# Patient Record
Sex: MALE | Race: WHITE
[De-identification: names, ages, dates, MRNs, and addresses within clinical notes are randomized per-mention and may not be internally consistent; named-entity substitution may affect disease eponyms.]

---

## 2017-05-21 ENCOUNTER — HOSPITAL ENCOUNTER (EMERGENCY)
Dept: HOSPITAL 17 - NEPK | Age: 22
Discharge: LEFT BEFORE DISCHARGE | End: 2017-05-21
Payer: COMMERCIAL

## 2017-05-21 VITALS
RESPIRATION RATE: 16 BRPM | OXYGEN SATURATION: 100 % | TEMPERATURE: 98.8 F | SYSTOLIC BLOOD PRESSURE: 131 MMHG | DIASTOLIC BLOOD PRESSURE: 76 MMHG | HEART RATE: 91 BPM

## 2017-05-21 DIAGNOSIS — R59.1: Primary | ICD-10-CM

## 2017-05-21 PROCEDURE — 99281 EMR DPT VST MAYX REQ PHY/QHP: CPT

## 2017-05-21 NOTE — PD
HPI


Chief Complaint:  Lump, Cyst, Hernia


Time Seen by Provider:  20:45


Travel History


International Travel<30 days:  No


Contact w/Intl Traveler<30days:  No


Traveled to known affect area:  No





History of Present Illness


HPI


22-year-old male presents to emergency department for evaluation of a palpable 

lump in his groin area noticed approximately 4 months ago.  Patient states it 

does not hurt.  Denies any recent illnesses, fever, or chills.  He states he 

does have cats in the house and has probably sustained several scratches.  

Denies any testicular lesions.  No urinary symptoms.  No pelvic or abdominal 

pain.  He states he is otherwise healthy.  He has no other symptoms to report.  

Patient states he does have a primary care provider through his mother's 

insurance he cannot think of the name of them right now





History


Past Medical Histgory


Medical History:  Denies Significant Hx


Hx Cancer:  No





Social History


Alcohol Use:  No


Tobacco Use:  No





Allergies-Medications


(Allergen,Severity, Reaction):  


Coded Allergies:  


     No Known Allergies (Verified , 5/21/17)


Reported Meds & Prescriptions





Reported Meds & Active Scripts


Active


Zoloft (Sertraline HCl) 50 Mg Tab 25 Mg PO DAILY 








Review of Systems


Except as stated in HPI:  all other systems reviewed are Neg





Physical Exam


Narrative


GENERAL: Well-nourished, well-developed patient in no acute distress


SKIN: Focused skin assessment warm/dry.


HEAD: Normocephalic.


EYES: No scleral icterus. No injection or drainage. 


NECK: Supple, trachea midline. No JVD or lymphadenopathy.


CARDIOVASCULAR: Regular rate and rhythm without murmurs, gallops, or rubs. 


RESPIRATORY: Breath sounds equal bilaterally. No accessory muscle use.


Abdomen:  Abdomen soft, non-tender, nondistended. Positive bowel sounds.  No 

hepato-splenomegaly, or palpable masses. No guarding.  There is a 1 cm in 

diameter palpable, mobile lymph node in the right inguinal area.  No erythema 

or edema.


GENITOURINARY:  Circumcised. Testes descended bilaterally without evidence of 

rotation.  No lesions or erythema.  No urethral discharge.


MUSCULOSKELETAL: No cyanosis, or edema. 


BACK: Nontender without obvious deformity. No CVA tenderness.





Data


Data


Last Documented VS





Vital Signs








  Date Time  Temp Pulse Resp B/P Pulse Ox O2 Delivery O2 Flow Rate FiO2


 


5/21/17 20:11 98.8 91 16 131/76 100 Room Air  











MDM


Medical Screen Exam Complete:  Yes


Emergency Medical Condition:  No


Differential Diagnosis


Inguinal lymphadenopathy versus cat scratch disease versus local reaction 

versus cancer


Narrative Course


22-year-old male presents to the emergency department for evaluation of a 

palpable lymph node in his right inguinal chain.  H otherwise appears well.  

There is a palpable 1 cm diameter, mobile lymph node noted in this area.  

Testicular exam is completely benign at this time.  I discussed in depth the 

possible etiologies of this from very benign to very serious to include cancer.

  He states that he does have a primary care provider and he can follow-up with 

outpatient.  I advised that he call tomorrow which is Monday to schedule an 

appointment for further evaluation of this.  He verbalizes understanding and 

agrees to follow through with this plan of care.  He agrees to return 

immediately with any acute worsening of symptoms.


A medical screening exam was performed: 


At the time of evaluation the presenting medical condition was determined not 

to be of an emergent nature. 


The patient was given the option of receiving additional care, but declined. 


Patient was given options for additional community resources from which to 

obtain care.


The Patient Has Been advised to seek medical attention for their presenting 

complaint.


The patient has been advised to return to the ER at any time if an emergent 

condition develops.





 Primary Impression:  


 Inguinal lymphadenopathy


 Additional Impression:  


 Encounter for medical screening examination


Condition:  Stable








Vandana Wooten May 21, 2017 20:45

## 2018-05-04 ENCOUNTER — HOSPITAL ENCOUNTER (INPATIENT)
Dept: HOSPITAL 17 - NEPD | Age: 23
LOS: 7 days | Discharge: HOME | DRG: 885 | End: 2018-05-11
Attending: PSYCHIATRY & NEUROLOGY | Admitting: PSYCHIATRY & NEUROLOGY
Payer: COMMERCIAL

## 2018-05-04 VITALS
DIASTOLIC BLOOD PRESSURE: 73 MMHG | OXYGEN SATURATION: 97 % | RESPIRATION RATE: 18 BRPM | HEART RATE: 56 BPM | TEMPERATURE: 97.5 F | SYSTOLIC BLOOD PRESSURE: 113 MMHG

## 2018-05-04 VITALS — BODY MASS INDEX: 16 KG/M2 | WEIGHT: 105.6 LBS | HEIGHT: 68 IN

## 2018-05-04 VITALS
SYSTOLIC BLOOD PRESSURE: 115 MMHG | RESPIRATION RATE: 16 BRPM | HEART RATE: 92 BPM | OXYGEN SATURATION: 99 % | TEMPERATURE: 97.6 F | DIASTOLIC BLOOD PRESSURE: 62 MMHG

## 2018-05-04 VITALS
DIASTOLIC BLOOD PRESSURE: 80 MMHG | SYSTOLIC BLOOD PRESSURE: 114 MMHG | OXYGEN SATURATION: 99 % | RESPIRATION RATE: 18 BRPM | HEART RATE: 63 BPM | TEMPERATURE: 97.5 F

## 2018-05-04 DIAGNOSIS — Z91.5: ICD-10-CM

## 2018-05-04 DIAGNOSIS — F14.10: ICD-10-CM

## 2018-05-04 DIAGNOSIS — R45.851: ICD-10-CM

## 2018-05-04 DIAGNOSIS — F10.10: ICD-10-CM

## 2018-05-04 DIAGNOSIS — F12.90: ICD-10-CM

## 2018-05-04 DIAGNOSIS — Z87.891: ICD-10-CM

## 2018-05-04 DIAGNOSIS — F33.2: Primary | ICD-10-CM

## 2018-05-04 LAB
ALBUMIN SERPL-MCNC: 3.9 GM/DL (ref 3.4–5)
ALP SERPL-CCNC: 79 U/L (ref 45–117)
ALT SERPL-CCNC: 17 U/L (ref 12–78)
APAP SERPL-MCNC: (no result) MCG/ML (ref 10–30)
AST SERPL-CCNC: 22 U/L (ref 15–37)
BASOPHILS # BLD AUTO: 0 TH/MM3 (ref 0–0.2)
BASOPHILS NFR BLD: 0.3 % (ref 0–2)
BILIRUB SERPL-MCNC: 0.8 MG/DL (ref 0.2–1)
BUN SERPL-MCNC: 7 MG/DL (ref 7–18)
CALCIUM SERPL-MCNC: 8.8 MG/DL (ref 8.5–10.1)
CHLORIDE SERPL-SCNC: 106 MEQ/L (ref 98–107)
CREAT SERPL-MCNC: 1.01 MG/DL (ref 0.6–1.3)
EOSINOPHIL # BLD: 0.2 TH/MM3 (ref 0–0.4)
EOSINOPHIL NFR BLD: 2.1 % (ref 0–4)
ERYTHROCYTE [DISTWIDTH] IN BLOOD BY AUTOMATED COUNT: 13.4 % (ref 11.6–17.2)
GFR SERPLBLD BASED ON 1.73 SQ M-ARVRAT: 93 ML/MIN (ref 89–?)
GLUCOSE SERPL-MCNC: 94 MG/DL (ref 74–106)
HCO3 BLD-SCNC: 24.2 MEQ/L (ref 21–32)
HCT VFR BLD CALC: 40 % (ref 39–51)
HGB BLD-MCNC: 14 GM/DL (ref 13–17)
LYMPHOCYTES # BLD AUTO: 1.9 TH/MM3 (ref 1–4.8)
LYMPHOCYTES NFR BLD AUTO: 19.2 % (ref 9–44)
MCH RBC QN AUTO: 29.5 PG (ref 27–34)
MCHC RBC AUTO-ENTMCNC: 35 % (ref 32–36)
MCV RBC AUTO: 84.1 FL (ref 80–100)
MONOCYTE #: 0.6 TH/MM3 (ref 0–0.9)
MONOCYTES NFR BLD: 6.5 % (ref 0–8)
NEUTROPHILS # BLD AUTO: 7.1 TH/MM3 (ref 1.8–7.7)
NEUTROPHILS NFR BLD AUTO: 71.9 % (ref 16–70)
PLATELET # BLD: 233 TH/MM3 (ref 150–450)
PMV BLD AUTO: 7.5 FL (ref 7–11)
PROT SERPL-MCNC: 7.2 GM/DL (ref 6.4–8.2)
RBC # BLD AUTO: 4.75 MIL/MM3 (ref 4.5–5.9)
SODIUM SERPL-SCNC: 141 MEQ/L (ref 136–145)
WBC # BLD AUTO: 9.9 TH/MM3 (ref 4–11)

## 2018-05-04 PROCEDURE — 84443 ASSAY THYROID STIM HORMONE: CPT

## 2018-05-04 PROCEDURE — 99285 EMERGENCY DEPT VISIT HI MDM: CPT

## 2018-05-04 PROCEDURE — 85025 COMPLETE CBC W/AUTO DIFF WBC: CPT

## 2018-05-04 PROCEDURE — 80307 DRUG TEST PRSMV CHEM ANLYZR: CPT

## 2018-05-04 PROCEDURE — 83036 HEMOGLOBIN GLYCOSYLATED A1C: CPT

## 2018-05-04 PROCEDURE — 80053 COMPREHEN METABOLIC PANEL: CPT

## 2018-05-04 PROCEDURE — 80061 LIPID PANEL: CPT

## 2018-05-04 RX ADMIN — NICOTINE PRN PATCH: 21 PATCH, EXTENDED RELEASE TOPICAL at 20:06

## 2018-05-04 NOTE — PD
HPI


Chief Complaint:  Psychiatric Symptoms


Time Seen by Provider:  09:50


Travel History


International Travel<30 days:  No


Contact w/Intl Traveler<30days:  No


Traveled to known affect area:  No





History of Present Illness


HPI


23-year-old male complains of suicide ideation including a plan to inhale 

helium and attempt to die without experience of pain.  He reports anxiety 

attacks.  He reports drinking alcohol and using cocaine.  Most recent 

polysubstance abuse was last night.  He denies drug abuse otherwise.  He 

reports a history of major depression previously with a suicide attempt 

including walking to a bridge to jump off of it where he was interrupted.  He 

has no physical complaint. Timing constant.





PFSH


Past Medical History


ADHD:  No


Depression:  Yes


Cancer:  No


Cardiovascular Problems:  No


Diabetes:  No


Diminished Hearing:  No


Endocrine:  No


Genitourinary:  No


Headaches:  No


Hepatitis:  No


Hiatal Hernia:  No


Immune Disorder:  No


Musculoskeletal:  No


Neurologic:  No


Psychiatric:  Yes (HBS 2014)


Reproductive:  No


Respiratory:  No


Immunizations Current:  Yes


Migraines:  Yes (OCCASSIONALLY--ADVIL PRN)


Seizures:  No


Thyroid Disease:  No


Ulcer:  No





Past Surgical History


Joint Replacement:  No


Oral Surgery:  Yes (GUM SURGERY "YEARS AGO")


Pacemaker:  No





Social History


Alcohol Use:  No


Tobacco Use:  No


Substance Use:  Yes (ADMITS TO SMOKING POT YESTERDAY.)





Allergies-Medications


(Allergen,Severity, Reaction):  


Coded Allergies:  


     No Known Allergies (Verified  Adverse Reaction, Unknown, 5/4/18)


Reported Meds & Prescriptions





Reported Meds & Active Scripts


Active


Zoloft (Sertraline HCl) 50 Mg Tab 25 Mg PO DAILY 








Review of Systems


Except as stated in HPI:  all other systems reviewed are Neg


General / Constitutional:  No: Fever





Physical Exam


Narrative


GENERAL: 24 yo M, WNWD, NAD, cooperative





Vital Signs








  Date Time  Temp Pulse Resp B/P (MAP) Pulse Ox O2 Delivery O2 Flow Rate FiO2


 


5/4/18 09:45 97.6 92 16 115/62 (79) 99   








SKIN: Warm and dry.


HEAD: Atraumatic. Normocephalic. 


EYES: Pupils equal and round. No scleral icterus. No injection or drainage. 


ENT: No nasal bleeding or discharge.  Mucous membranes pink and moist.


NECK: Trachea midline. No JVD. 


CARDIOVASCULAR: Regular rate and rhythm.  


RESPIRATORY: No accessory muscle use. Clear to auscultation. Breath sounds 

equal bilaterally. 


GASTROINTESTINAL: Abdomen soft, non-tender, nondistended. Hepatic and splenic 

margins not palpable. 


MUSCULOSKELETAL: Extremities without clubbing, cyanosis, or edema. No obvious 

deformities. 


NEUROLOGICAL: Awake and alert. No obvious cranial nerve deficits.  Motor 

grossly within normal limits. Five out of 5 muscle strength in the arms and 

legs.  Normal speech.


PSYCHIATRIC: Patient reports suicidal ideation.  Positive polysubstance abuse.





Data


Data


Last Documented VS





Vital Signs








  Date Time  Temp Pulse Resp B/P (MAP) Pulse Ox O2 Delivery O2 Flow Rate FiO2


 


5/4/18 10:25   16     


 


5/4/18 09:45 97.6 92  115/62 (79) 99   








Orders





 Orders


Complete Blood Count With Diff (5/4/18 09:55)


Comprehensive Metabolic Panel (5/4/18 09:55)


Thyroid Stimulating Hormone (5/4/18 09:55)


Psych Screen (5/4/18 09:55)


Drug Screen, Random Urine (5/4/18 09:55)


Alcohol (Ethanol) (5/4/18 09:55)


Salicylates (Aspirin) (5/4/18 09:55)


Tylenol (Acetaminophen) (5/4/18 09:55)





Labs





Laboratory Tests








Test


  5/4/18


10:15


 


White Blood Count 9.9 TH/MM3 


 


Red Blood Count 4.75 MIL/MM3 


 


Hemoglobin 14.0 GM/DL 


 


Hematocrit 40.0 % 


 


Mean Corpuscular Volume 84.1 FL 


 


Mean Corpuscular Hemoglobin 29.5 PG 


 


Mean Corpuscular Hemoglobin


Concent 35.0 % 


 


 


Red Cell Distribution Width 13.4 % 


 


Platelet Count 233 TH/MM3 


 


Mean Platelet Volume 7.5 FL 


 


Neutrophils (%) (Auto) 71.9 % 


 


Lymphocytes (%) (Auto) 19.2 % 


 


Monocytes (%) (Auto) 6.5 % 


 


Eosinophils (%) (Auto) 2.1 % 


 


Basophils (%) (Auto) 0.3 % 


 


Neutrophils # (Auto) 7.1 TH/MM3 


 


Lymphocytes # (Auto) 1.9 TH/MM3 


 


Monocytes # (Auto) 0.6 TH/MM3 


 


Eosinophils # (Auto) 0.2 TH/MM3 


 


Basophils # (Auto) 0.0 TH/MM3 


 


CBC Comment DIFF FINAL 


 


Differential Comment  


 


Blood Urea Nitrogen 7 MG/DL 


 


Creatinine 1.01 MG/DL 


 


Random Glucose 94 MG/DL 


 


Total Protein 7.2 GM/DL 


 


Albumin 3.9 GM/DL 


 


Calcium Level 8.8 MG/DL 


 


Alkaline Phosphatase 79 U/L 


 


Aspartate Amino Transf


(AST/SGOT) 22 U/L 


 


 


Alanine Aminotransferase


(ALT/SGPT) 17 U/L 


 


 


Total Bilirubin 0.8 MG/DL 


 


Sodium Level 141 MEQ/L 


 


Potassium Level 3.7 MEQ/L 


 


Chloride Level 106 MEQ/L 


 


Carbon Dioxide Level 24.2 MEQ/L 


 


Anion Gap 11 MEQ/L 


 


Estimat Glomerular Filtration


Rate 93 ML/MIN 


 


 


Thyroid Stimulating Hormone


3rd Gen 2.580 uIU/ML 


 


 


Salicylates Level


  LESS THAN 1.7


MG/DL


 


Urine Opiates Screen NEG 


 


Acetaminophen Level


  LESS THAN 2.0


MCG/ML


 


Urine Barbiturates Screen NEG 


 


Urine Amphetamines Screen NEG 


 


Urine Benzodiazepines Screen NEG 


 


Urine Cocaine Screen POS 


 


Urine Cannabinoids Screen NEG 


 


Ethyl Alcohol Level


  LESS THAN 3


MG/DL











MDM


Medical Decision Making


Medical Screen Exam Complete:  Yes


Emergency Medical Condition:  Yes


Medical Record Reviewed:  Yes


Differential Diagnosis


Altered mental status/psychosis due to infection/environmental exposure/

metabolic abnormality, polypharmacy, alcohol abuse/intoxication, illicit or 

prescribed drug abuse, malingering/secondary gain, non-organic psychiatric 

disease


Narrative Course





CBC & BMP Diagram


5/4/18 10:15








Total Protein 7.2, Albumin 3.9, Calcium Level 8.8, Alkaline Phosphatase 79, 

Aspartate Amino Transf (AST/SGOT) 22, Alanine Aminotransferase (ALT/SGPT) 17, 

Total Bilirubin 0.8





Tox screen positive for cocaine  





[place] Act completed by the undersigned because the patient has suicidal 

ideation with a plan and has a history of suicide attempt.  It seems obvious 

that if the patient were to stop drinking alcohol and cocaine he would not 

experience depressed mood like he is however at the moment he has a suicidal 

plan and the ability to execute it.





Diagnosis





 Primary Impression:  


 Suicidal ideation


 Additional Impressions:  


 Cocaine abuse


 Alcohol abuse


 Planning to commit suicide





Admitting Information


Admitting Physician Requests:  Observation











Zaheer Siegel MD May 4, 2018 10:04

## 2018-05-05 VITALS
OXYGEN SATURATION: 97 % | HEART RATE: 68 BPM | TEMPERATURE: 97.7 F | DIASTOLIC BLOOD PRESSURE: 59 MMHG | SYSTOLIC BLOOD PRESSURE: 106 MMHG | RESPIRATION RATE: 16 BRPM

## 2018-05-05 VITALS
OXYGEN SATURATION: 98 % | SYSTOLIC BLOOD PRESSURE: 112 MMHG | RESPIRATION RATE: 16 BRPM | DIASTOLIC BLOOD PRESSURE: 63 MMHG | HEART RATE: 81 BPM | TEMPERATURE: 98.1 F

## 2018-05-05 LAB
CHOLEST SERPL-MCNC: 100 MG/DL (ref 120–200)
CHOLESTEROL/ HDL RATIO: 1.98 RATIO
HDLC SERPL-MCNC: 50.3 MG/DL (ref 40–60)
LDLC SERPL-MCNC: 30 MG/DL (ref 0–99)
TRIGL SERPL-MCNC: 98 MG/DL (ref 42–150)

## 2018-05-05 RX ADMIN — MIRTAZAPINE SCH MG: 15 TABLET, FILM COATED ORAL at 22:05

## 2018-05-05 NOTE — HHI.HP
Provisional Diagnosis


Admission Date


May 4, 2018 at 16:51


Axis I.


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 

history of alcohol and multiple drug abuse





                               Certification of Person's Competence 


                           To Provide Express and Informed Consent





I have personally examined Donte Wilcox , a person being served at 

[facility] on, May 5, 2018 11:08.


Express and informed consent means consent voluntarily given in writing, by a 

competent person, after sufficient explanation and disclosure of the subject 

matter involved to enable the person to make a knowing and willful decision 

without any element of force, fraud, deceit, duress, or other form of 

constraint or coercion.





This person is 18 years of age or older, is not now known to be incompetent to 

consent to treatment with a guardian advocate, and does not have a health care 

surrogate or proxy currently making medical treatment decisions.  I have found 

this person to be one of the following:





[] Competent to provide express and informed consent, as defined above, for 

voluntary admission to this facility and is competent to provide express and 

informed consent for treatment.  He/she has the consistent capacity to make 

well reasoned, willful, and knowing decisions concerning his or her medical or 

mental health treatment.  The person fully and consistently understands the 

purpose of the admission for examination/placement and is fully capable of 

personally exercising all rights [place]





[] Incompetent to provide express and informed consent to voluntary admission, 

and this is incompetent to provide express and informed consent to treatment.  

The person must be transferred to involuntary status and a petition for a 

guardian advocate filed with the Circuit Court.





[]xxxx Refusing to provide express and informed consent to voluntary admission 

but is competent to provide express and informed consent for treatment.  The 

person must be discharged or transferred to involuntary status.





Form shall be completed within 24 hours of a person's arrival at the receiving 

facility and filed in the clinical record of each person:


1. Admitted on a voluntary basis


2. Permitted to provide express and informed consent to his/her own treatment


3. Allowed to transfer from involuntary to voluntary status


4. Prior to permitting a person to consent to his or her own treatment after 

having been previously found incompetent to consent to treatment.





History of Present Illness


Capacity:  Lacks Capacity (Patient lacks capacity to sign for hospitalization, 

patient has capacity to sign for medications and treatment)


Psych Chief Complaint:  Impression with suicidal ideation intent and plan


HPI


Patient is a 23-year-old white male comes to the emergency department with a 

history of suicidal ideation and plan polysubstance abuse, Way acted in the 

emergency department by Dr. Siegel dated May 4, 2018 at 10:04 AM this 

document reviewed and agreed with.  Patient seen and screened in the ED urine 

toxicology positive for cocaine, alcohol Tylenol and aspirin levels negligible.

  Review of EMR shows patient was hospitalized twice as a teenager and HBS once 

in  once in  for depression and suicidal ideation and also substance 

abuse at that time.  At the present time patient sitting quietly in his room, 

nurse Barbara present throughout session patient is a thin slight slender white 

male who appears his stated age with fairly thick short cut dark hair.  Patient 

states she has had increased depression over the past month or so.  He had been 

living with his grandparents.  His grandfather  in March fairly suddenly, 

his grandmother is becoming quite debilitated to the point where she is now in 

a nursing home and he is living by himself and his grandparents home with his 

PET Rottweiler.  Patient states increased depression with occasional panic type 

attacks with initial and middle and late insomnia, a.m. energy, with decreased 

energy, though vague crying spells, his appetite is poor though he denies any 

significant weight loss.  He is somewhat anhedonic.  There is increased social 

isolation, decreased concentration and attention, increased irritability and 

short temperedness.  While he denies voices or visions he states is marked 

increased paranoia especially around strangers.  He acknowledges increased use 

of alcohol and cocaine with past few weeks cocaine use essentially daily.  He 

acknowledges polysubstance abuse in the past partying with friends.  He denies 

any prior detox or rehab or legal issues related to drugs.  He denies any past 

physical or sexual abuse.  Of interest he states his mother and father were 

never  he is only now having a relationship with his father.  With his 

father he has been  twice subsequent to him and his children by those 2 

marriages, and his mother's side his mother was  prior to she having him 

the child by that marriage and she has been  after him the child by that 

marriage.  Patient has graduated high school and is not working at [employer].

  He has dated girls in the past but not dating anyone now.  When asked to take 

the suicide prole if offered he stated he would take the suicide pill that his 

suicide plan was to put a bag over his head and inhale helium.  Patient states 

he has been also diagnosed with ulcerative colitis


At this time patient does not meet criteria for acute inpatient psychiatric 

hospitalization of the Way act L the first appointment request second 

opinion.  We will start patient on Remeron 15 mg at at bedtime and Abilify 5 mg 

in the morning.  Need to discuss with this young man perhaps placement,  some 

type of rehab or sober living facility for him.





Review of Systems


Constitutional:  DENIES: Diaphoretic episodes, Fatigue, Fever, Weight gain, 

Weight loss, Chills, Dizziness, Change in appetite, Night Sweats


Endocrine:  DENIES: Heat/cold intolerance, Polydipsia, Polyuria, Polyphagia


Eyes:  DENIES: Blurred vision, Diplopia, Eye inflammation, Eye pain, Vision loss

, Photosensitivity, Double Vision


Ears, nose, mouth, throat:  DENIES: Tinnitus, Hearing loss, Vertigo, Nasal 

discharge, Oral lesions, Throat pain, Hoarseness, Ear Pain, Running Nose, 

Epistaxis, Sinus Pain, Toothache, Odynophagia


Respiratory:  DENIES: Apneas, Cough, Snoring, Wheezing, Hemoptysis, Sputum 

production, Shortness of breath


Cardiovascular:  DENIES: Chest pain, Palpitations, Syncope, Dyspnea on Exertion

, PND, Lower Extremity Edema, Orthopnea, Claudication


Gastrointestinal:  DENIES: Abdominal pain, Black stools, Bloody stools, 

Constipation, Diarrhea, Nausea, Vomiting, Difficulty Swallowing, Anorexia


Genitourinary:  DENIES: Sexual dysfunction, Urinary frequency, Urinary 

incontinence, Urgency, Hematuria, Dysuria, Nocturia, Penile Discharge, 

Testicular Pain, Testicular Swelling


Musculoskeletal:  DENIES: Joint pain, Muscle aches, Stiffness, Joint Swelling, 

Back pain, Neck pain


Integumentary:  DENIES: Abnormal pigmentation, Nail changes, Pruritus, Rash


Hematologic/lymphatic:  DENIES: Bruising, Lymphadenopathy


Immunologic/allergic:  DENIES: Eczema, Urticaria


Neurologic:  DENIES: Abnormal gait, Headache, Localized weakness, Paresthesias, 

Seizures, Speech Problems, Tremor, Poor Balance


Psychiatric:  COMPLAINS OF: Anxiety, Depression, Suicidal Ideation





Past Psych History


Psychological trauma history


Recent death of patient's grandfather, and placement of grandmother in a 

facility otherwise patient denies physical or sexual abuse


Violence risk - others (6 mos)


Low


Violence risk - self (6 mos)


High





Substance Abuse History


Drugs/Alcohol past 12 months


Patient states recent increased use of alcohol and cocaine past use of 

marijuana and other drugs





Past Family Social History


Coded Allergies:  


     No Known Allergies (Verified  Allergy, Unknown, 18)


Discontinued Scripts


Sertraline Hcl (Zoloft) 50 Mg Tab, 25 MG PO DAILY for Depression Control, #10 

TAB 1 Refill


   Prov:Bora Sandhu MD         11/6/15





Current Medications








 Medications


  (Trade)  Dose


 Ordered  Sig/Lou


 Route  Start Time


 Stop Time Status Last Admin


 


  (Benadryl)  50 mg  HS  PRN


 PO  18 17:00


     


 


 


  (Tylenol)  650 mg  Q4H  PRN


 PO  18 17:00


     


 


 


  (Milk Of


 Magnesia Liq)  30 ml  DAILY  PRN


 PO  18 17:00


    18 20:05


 


 


  (Mag-Al Plus


 Susp Liq)  30 ml  Q6H  PRN


 PO  18 17:00


     


 


 


  (Habitrol 21 Mg


 Patch.24 Hr)  1 patch  DAILY  PRN


 T-DERMAL  18 17:00


    18 20:06


 


 


 Miscellaneous


 Information  1  HS


 T-DERMAL  18 21:00


     


 


 


  (Remeron)  15 mg  HS


 PO  18 21:00


   UNV  


 


 


  (Abilify)  5 mg  DAILY


 PO  18 11:00


   UNV  


 








Family Psych History


Patient denies


Social History


Patient living in grandparents house by himself with his pet dog, states he has 

had girlfriends in the past, and does socialize with a few friends


Patient's Strengths (min. 2)


Patient verbal able access healthcare is cooperative





Physical Exam


Patient medically cleared in the ED at the present time patient sitting quietly 

in his room is in no acute distress, is in no respiratory distress, no 

complaints of abdominal pain at this time, patient moving all 4 extremities 

without difficulty no abnormal motor movements noted


Vital Signs





Vital Signs








  Date Time  Temp Pulse Resp B/P (MAP) Pulse Ox O2 Delivery O2 Flow Rate FiO2


 


18 06:49 97.7 68 16 106/59 (75) 97   


 


18 12:18      Room Air  














I/O   


 


 18





 08:00 16:00 00:00


 


Intake Total  240 ml 


 


Balance  240 ml 








Lab Results











Test


  18


09:15


 


Triglycerides Level 98 MG/DL 


 


Cholesterol Level 100 MG/DL 


 


LDL Cholesterol 30 MG/DL 


 


HDL Cholesterol 50.3 MG/DL 


 


Cholesterol/HDL Ratio 1.98 RATIO 











Mental Status Examination


Appearance:  Appropriate


Consciousness:  Alert


Orientation:  x4


Motor Activity:  Normal gait


Speech:  Unremarkable


Language:  Adequate


Fund of Knowledge:  Adequate


Attention and Concentration:  Adequate


Memory:  Unremarkable


Mood:  Appropriate


Affect:  Appropriate


Thought Process & Associations:  Intact


Thought Content:  Appropriate


Hallucination Type:  None


Delusion Type:  Paranoid (Mildly paranoid around strangers but not to a 

delusional intensity)


Suicidal Ideation:  Yes


Suicidal Plan:  Yes (Patient states he would take the suicide pill)


Suicidal Intention:  Yes (Patient states to take the suicide pill)


Homicidal Ideation:  No


Homicidal Plan:  No


Homicidal Intention:  No


Insight:  Fair


Judgment:  Impulsive





Assessment & Plan


Problem List:  


(1) History of polysubstance abuse


(2) Major depressive disorder, recurrent severe without psychotic features


ICD Codes:  F33.2 - Major depressive disorder, recurrent severe without 

psychotic features


(3) Cocaine abuse


ICD Codes:  F14.10 - Cocaine abuse, uncomplicated


Status:  Acute


(4) Alcohol abuse


ICD Codes:  F10.10 - Alcohol abuse, uncomplicated


Status:  Acute


Assessment & Plan


Estimated LOS 5-7:  days patient meets criteria for involuntary psychiatric 

hospitalization the Way act all the first opinion request second opinion.  I 

feel he does have capacity signed for medication and treatment.  We will start 

patient on Remeron 15 mg at bedtime and Abilify 5 mg in the morning.  We will 

also work on placement issues also taking into consideration his polysubstance 

abuse history


Discharge Planning


To be determined


Request HC Surrog/Guard Advoc?:  No











Orville Webster MD May 5, 2018 12:00

## 2018-05-05 NOTE — PD.CONS
HPI


Service


[facility]


Consult Requested By


Dr. Orville Webster


Reason for Consult


Medical management


Primary Care Physician


Dr. Emeterio Olmos


Diagnoses:  


History of Present Illness


Mr. Wilcox is a 24 y/o WM with hx of depression and polysubstance able. Pt 

was admitted under Baker Act to the inpatient psychiatry unit for suicidal 

ideation. The pt reports that he has been depressed more so lately since the 

death of his grandfather and the failing wayne of his grandmother whom he lived 

with. He has been drinking daily and snorting cocaine every day for the past 

few weeks. He states that he has been having some panic attacks lately and has 

been considering suicide. He states that he had planning on using helium to 

kill himself. The pt is currently without any physical complaints. Denies any 

nausea/vomiting, abd pain, chest pain, diarrhea, constipation, SOB, palpitations

, dizziness, headache, weakness or urinary issues.





Review of Systems


Constitutional:  DENIES: Fever, Chills, Dizziness


Respiratory:  DENIES: Cough, Shortness of breath


Cardiovascular:  DENIES: Chest pain


Gastrointestinal:  DENIES: Abdominal pain, Constipation, Diarrhea, Nausea, 

Vomiting


Genitourinary:  DENIES: Urgency, Hematuria, Dysuria


Integumentary:  DENIES: Rash


Neurologic:  DENIES: Headache


Psychiatric:  COMPLAINS OF: Depression, Suicidal Ideation, DENIES: Confusion





Past Family Social History


Past Medical History


Depression


Polysubstance abuse


Past Surgical History


Nasal deviated septum repair


Reported Medications


No home meds


Allergies:  


Coded Allergies:  


     No Known Allergies (Verified  Allergy, Unknown, 5/4/18)


Family History


Noncontributory


Social History


(+)Alcohol use


(+)Cocaine and marijuana use


Pt states that he used to smoke but no longer does





Physical Exam


Vital Signs





Vital Signs








  Date Time  Temp Pulse Resp B/P (MAP) Pulse Ox O2 Delivery O2 Flow Rate FiO2


 


5/5/18 06:49 97.7 68 16 106/59 (75) 97   


 


5/4/18 18:38 97.5 56 18 113/73 (86) 97   


 


5/4/18 17:52        








Physical Exam


GENERAL: This is a well-nourished, well-developed patient, in no apparent 

distress.


SKIN: No rashes, ecchymoses or lesions. Cool and dry.


HEENT: Atraumatic. Normocephalic. No temporal or scalp tenderness. No scleral 

icterus. Airway patent.


NECK: Trachea midline, supple, nontender.


CARDIO: Regular. 


RESP: CTA bilaterally. No wheezes, rales, or rhonchi.  


ABD: +BS, soft, non-tender, nondistended. No hepato-splenomegaly, or palpable 

masses. No guarding.


EXT: Extremities without clubbing, cyanosis, or edema. 


NEURO: Awake and alert. Motor and sensory grossly within normal limits. Normal 

speech.


Laboratory





Laboratory Tests








Test


  5/5/18


09:15


 


Triglycerides Level 98 


 


Cholesterol Level 100 


 


LDL Cholesterol 30 


 


HDL Cholesterol 50.3 


 


Cholesterol/HDL Ratio 1.98 








Result Diagram:  


5/4/18 1015                                                                    

            5/4/18 1015








Assessment and Plan


Problem List:  


(1) Major depressive disorder, recurrent severe without psychotic features


ICD Codes:  F33.2 - Major depressive disorder, recurrent severe without 

psychotic features


Status:  Chronic


Plan:  


Major Depression


Suicidal ideation


Polysubstance abuse


- Pt is a 24 y/o male with depression and polysubstance abuse who was admitted 

to [facility] under Baker Act for suicidal ideation


- Pt has been started on Abilify and Remeron


- Discussed alcohol and substance abuse cessation. 


- Substance and alcohol abuse counseling. 


- No reported hx of withdrawal symptoms/DTs 


- Management per Psychiatry





(2) Suicidal ideation


ICD Codes:  R45.851 - Suicidal ideations


Status:  Acute


(3) Cocaine abuse


ICD Codes:  F14.10 - Cocaine abuse, uncomplicated


Status:  Acute


(4) Alcohol abuse


ICD Codes:  F10.10 - Alcohol abuse, uncomplicated


Status:  Acute











Charleen Garza May 5, 2018 13:36


Ruperto Celaya MD May 5, 2018 17:33

## 2018-05-06 VITALS
SYSTOLIC BLOOD PRESSURE: 110 MMHG | OXYGEN SATURATION: 98 % | TEMPERATURE: 98 F | HEART RATE: 78 BPM | RESPIRATION RATE: 16 BRPM | DIASTOLIC BLOOD PRESSURE: 64 MMHG

## 2018-05-06 VITALS
DIASTOLIC BLOOD PRESSURE: 68 MMHG | SYSTOLIC BLOOD PRESSURE: 110 MMHG | RESPIRATION RATE: 16 BRPM | TEMPERATURE: 97.5 F | OXYGEN SATURATION: 96 % | HEART RATE: 75 BPM

## 2018-05-06 LAB — HBA1C MFR BLD: 4.9 % (ref 4.3–6)

## 2018-05-06 RX ADMIN — MIRTAZAPINE SCH MG: 15 TABLET, FILM COATED ORAL at 21:09

## 2018-05-06 NOTE — HHI.PYPN
Subjective


Chief Complaint:  Impression with suicidal ideation intent and plan


Remarks


This is a request for second opinion.  Admission note was reviewed and I agree 

with the history.  Patient was seen and case was discussed with nursing.  

Patient is feeling sleepy secondary to the Remeron.  We spoke about his drug 

use and he says he wants to stop after he leaves the hospital.  Affect is quite 

flat.  He remains depressed but denies suicidal ideation intent or plan.  

Largely seclusive to self





Mental Status Examination


Appearance:  Appropriate


Consciousness:  Alert


Orientation:  x4


Motor Activity:  Normal gait


Speech:  Unremarkable


Language:  Adequate


Fund of Knowledge:  Adequate


Attention and Concentration:  Adequate


Memory:  Unremarkable


Mood:  Appropriate


Affect:  Appropriate


Thought Process & Associations:  Intact


Thought Content:  Appropriate


Hallucination Type:  None


Delusion Type:  Paranoid (Mildly paranoid around strangers but not to a 

delusional intensity)


Suicidal Ideation:  No


Suicidal Plan:  No


Suicidal Intention:  No


Homicidal Ideation:  No


Homicidal Plan:  No


Homicidal Intention:  No


Insight:  Fair


Judgment:  Impulsive





Results


Vitals/IOs





Vital Signs








  Date Time  Temp Pulse Resp B/P (MAP) Pulse Ox O2 Delivery O2 Flow Rate FiO2


 


5/6/18 05:39 97.5 75 16 110/68 (82) 96   


 


5/4/18 12:18      Room Air  











Assessment & Plan


Problem List:  


(1) History of polysubstance abuse


(2) Major depressive disorder, recurrent severe without psychotic features


ICD Codes:  F33.2 - Major depressive disorder, recurrent severe without 

psychotic features


Status:  Chronic


(3) Cocaine abuse


ICD Codes:  F14.10 - Cocaine abuse, uncomplicated


Status:  Acute


(4) Alcohol abuse


ICD Codes:  F10.10 - Alcohol abuse, uncomplicated


Status:  Acute


Assessment & Plan


I agree with the first opinion to continue petition.  Criteria include suicidal 

ideation with plan


Justification for Cont. Inpt.


Patient would decompensate in a less restrictive setting


Request HC Surrog/Guard Advoc?:  No











Nehemiah Etienne DO May 6, 2018 13:34

## 2018-05-07 VITALS
OXYGEN SATURATION: 97 % | TEMPERATURE: 97.7 F | HEART RATE: 72 BPM | SYSTOLIC BLOOD PRESSURE: 114 MMHG | RESPIRATION RATE: 16 BRPM | DIASTOLIC BLOOD PRESSURE: 73 MMHG

## 2018-05-07 VITALS
OXYGEN SATURATION: 99 % | RESPIRATION RATE: 18 BRPM | TEMPERATURE: 97.9 F | DIASTOLIC BLOOD PRESSURE: 64 MMHG | HEART RATE: 78 BPM | SYSTOLIC BLOOD PRESSURE: 119 MMHG

## 2018-05-07 RX ADMIN — NICOTINE PRN PATCH: 21 PATCH, EXTENDED RELEASE TOPICAL at 21:35

## 2018-05-07 RX ADMIN — MIRTAZAPINE SCH MG: 15 TABLET, FILM COATED ORAL at 21:11

## 2018-05-07 NOTE — PD.TTN
Patient Problems


1. Discharge planning


2. Medication compliance


3. Knowledge deficit


4. Lack of coping skills





Progress Toward Goals


Provider Present:  Dr. CARLIN Webster


Group Spec/RT/OT/BARAJAS Present:  JENN Mathur (5/7/18- Pt. was cooperative 

and pleasant during groups.)











Clemente Velez May 7, 2018 15:36

## 2018-05-07 NOTE — HHI.PYPN
Subjective


Chief Complaint:  Impression with suicidal ideation intent and plan


Remarks


Patient is seen in his room with nurse Citlalli, chart reviewed, patient 

compliant medications, patient discussed with nurse.  Patient continues 

depressed stating he will would still take the suicide pill of offered him 

today.  He states he talked with both his mother and father last night.  He 

states his mother would like the hospital [facility].  Father states that he would 

like to make things better before he is discharged.  Patient states he slept 

better last night he has some increased focus.  He so wishes to discuss the 

possibility of a sober living rehab or hair for a type house.  When the 

counselor Donte meet with him.





Review of Systems


Except as stated in HPI:  all other systems reviewed are Neg





Mental Status Examination


Appearance:  Appropriate


Consciousness:  Alert


Orientation:  x4


Motor Activity:  Normal gait


Speech:  Unremarkable


Language:  Adequate


Fund of Knowledge:  Adequate


Attention and Concentration:  Adequate


Memory:  Unremarkable


Mood:  Appropriate


Affect:  Appropriate


Thought Process & Associations:  Intact


Thought Content:  Appropriate


Hallucination Type:  None


Delusion Type:  Paranoid (Mildly paranoid around strangers but not to a 

delusional intensity)


Suicidal Ideation:  No


Suicidal Plan:  No


Suicidal Intention:  No


Homicidal Ideation:  No


Homicidal Plan:  No


Homicidal Intention:  No


Insight:  Fair


Judgment:  Impulsive





Results


Vitals/IOs





Vital Signs








  Date Time  Temp Pulse Resp B/P (MAP) Pulse Ox O2 Delivery O2 Flow Rate FiO2


 


5/7/18 05:48 97.7 72 16 114/73 (87) 97   


 


5/4/18 12:18      Room Air  














Intake and Output   


 


 5/7/18 5/7/18 5/7/18





 07:59 15:59 23:59


 


Intake Total  120 ml 


 


Balance  120 ml 











Assessment & Plan


Problem List:  


(1) History of polysubstance abuse


(2) Major depressive disorder, recurrent severe without psychotic features


ICD Codes:  F33.2 - Major depressive disorder, recurrent severe without 

psychotic features


Status:  Chronic


(3) Cocaine abuse


ICD Codes:  F14.10 - Cocaine abuse, uncomplicated


Status:  Acute


(4) Alcohol abuse


ICD Codes:  F10.10 - Alcohol abuse, uncomplicated


Status:  Acute


Assessment & Plan


Estimated LOS:  days patient remains depressed with suicidal ideation and intent

, would take the suicide pill daily.  Though his affect is slightly improved.  

He continues to be willing to consider penitentiary living situation or sober living 

situation for now continue treatment no change


Justification for Cont. Inpt.


At this time patient would decompensate if placed in a lower level of care


Discharge Planning


To be determined


Request HC Surrog/Guard Advoc?:  No











Orville Webster MD May 7, 2018 13:42

## 2018-05-08 VITALS
OXYGEN SATURATION: 98 % | RESPIRATION RATE: 15 BRPM | TEMPERATURE: 97.4 F | SYSTOLIC BLOOD PRESSURE: 113 MMHG | HEART RATE: 73 BPM | DIASTOLIC BLOOD PRESSURE: 59 MMHG

## 2018-05-08 VITALS
DIASTOLIC BLOOD PRESSURE: 68 MMHG | HEART RATE: 66 BPM | SYSTOLIC BLOOD PRESSURE: 112 MMHG | RESPIRATION RATE: 18 BRPM | TEMPERATURE: 98.8 F | OXYGEN SATURATION: 100 %

## 2018-05-08 RX ADMIN — MIRTAZAPINE SCH MG: 15 TABLET, FILM COATED ORAL at 21:31

## 2018-05-08 NOTE — HHI.PYPN
Subjective


Chief Complaint:  Impression with suicidal ideation intent and plan


Remarks


Patient is seen in day room with nurse Mamie, chart reviewed, patient 

complaint medications, patient discussed with nurse.  He states he slept all 

night last night, the "first time in years".  He still remains somewhat sad 

though his affect is improving his eye contact is improving.  He does deny 

suicidality voices or visions at the present time.  Patient scheduled to see a 

counselor Donte this afternoon to discuss placement options





Review of Systems


Except as stated in HPI:  all other systems reviewed are Neg





Mental Status Examination


Appearance:  Appropriate


Consciousness:  Alert


Orientation:  x4


Motor Activity:  Normal gait


Speech:  Unremarkable


Language:  Adequate


Fund of Knowledge:  Adequate


Attention and Concentration:  Adequate


Memory:  Unremarkable


Mood:  Appropriate


Affect:  Appropriate


Thought Process & Associations:  Intact


Thought Content:  Appropriate


Hallucination Type:  None


Delusion Type:  Paranoid (Mildly paranoid around strangers but not to a 

delusional intensity)


Suicidal Ideation:  No


Suicidal Plan:  No


Suicidal Intention:  No


Homicidal Ideation:  No


Homicidal Plan:  No


Homicidal Intention:  No


Insight:  Adequate


Judgment:  Adequate





Results


Vitals/IOs





Vital Signs








  Date Time  Temp Pulse Resp B/P (MAP) Pulse Ox O2 Delivery O2 Flow Rate FiO2


 


5/8/18 04:00 97.4 73 15 113/59 (77) 98   


 


5/4/18 12:18      Room Air  














Intake and Output   


 


 5/8/18 5/8/18 5/9/18





 08:00 16:00 00:00


 


Intake Total  360 ml 


 


Balance  360 ml 











Assessment & Plan


Problem List:  


(1) History of polysubstance abuse


(2) Major depressive disorder, recurrent severe without psychotic features


ICD Codes:  F33.2 - Major depressive disorder, recurrent severe without 

psychotic features


Status:  Chronic


(3) Cocaine abuse


ICD Codes:  F14.10 - Cocaine abuse, uncomplicated


Status:  Acute


(4) Alcohol abuse


ICD Codes:  F10.10 - Alcohol abuse, uncomplicated


Status:  Acute


Assessment & Plan


Estimated LOS:  days patient continues to improve, deny suicidality, sleeping 

better, patient scheduled to meet with Donte this afternoon to discuss placement 

options


Justification for Cont. Inpt.


At this time patient would decompensate a place to the lower level of care


Discharge Planning


Will discuss placement options with counselor Donte hours after room


Request HC Surrog/Guard Advoc?:  Orville Mijares MD May 8, 2018 13:52

## 2018-05-09 VITALS
TEMPERATURE: 98.7 F | SYSTOLIC BLOOD PRESSURE: 123 MMHG | OXYGEN SATURATION: 100 % | DIASTOLIC BLOOD PRESSURE: 68 MMHG | HEART RATE: 88 BPM | RESPIRATION RATE: 18 BRPM

## 2018-05-09 VITALS
DIASTOLIC BLOOD PRESSURE: 74 MMHG | RESPIRATION RATE: 19 BRPM | SYSTOLIC BLOOD PRESSURE: 121 MMHG | OXYGEN SATURATION: 96 % | HEART RATE: 79 BPM | TEMPERATURE: 97.7 F

## 2018-05-09 RX ADMIN — MIRTAZAPINE SCH MG: 15 TABLET, FILM COATED ORAL at 21:03

## 2018-05-09 RX ADMIN — NICOTINE SCH PATCH: 21 PATCH, EXTENDED RELEASE TOPICAL at 10:38

## 2018-05-09 NOTE — HHI.PYPN
Subjective


Chief Complaint:  Impression with suicidal ideation intent and plan


Remarks


Patient is seen in the day room with nurse Barbara, chart reviewed, patient 

complaint medications, patient discussed with nurse.  Patient calm and 

cooperative says he slept well again last night.  He denies suicidality voices 

or visions.  He states he talked with Lynne yesterday and they are researching 

finding him an appropriate rehab substance abuse program, he states he did talk 

with his mother she is in agreement with that.  At this time patient no longer 

meets Baker criteria will lift Way act allow patient to sign voluntary.  We 

will continue treatment no change





Review of Systems


Except as stated in HPI:  all other systems reviewed are Neg





Mental Status Examination


Appearance:  Appropriate


Consciousness:  Alert


Orientation:  x4


Motor Activity:  Normal gait


Speech:  Unremarkable


Language:  Adequate


Fund of Knowledge:  Adequate


Attention and Concentration:  Adequate


Memory:  Unremarkable


Mood:  Appropriate


Affect:  Appropriate


Thought Process & Associations:  Intact


Thought Content:  Appropriate


Hallucination Type:  None


Delusion Type:  Paranoid (Markedly diminished)


Suicidal Ideation:  No


Suicidal Plan:  No


Suicidal Intention:  No


Homicidal Ideation:  No


Homicidal Plan:  No


Homicidal Intention:  No


Insight:  Adequate


Judgment:  Adequate





Results


Vitals/IOs





Vital Signs








  Date Time  Temp Pulse Resp B/P (MAP) Pulse Ox O2 Delivery O2 Flow Rate FiO2


 


5/9/18 06:40 97.7 79 19 121/74 (90) 96   














Intake and Output   


 


 5/9/18 5/9/18 5/10/18





 08:00 16:00 00:00


 


Intake Total  240 ml 


 


Balance  240 ml 











Assessment & Plan


Problem List:  


(1) History of polysubstance abuse


(2) Major depressive disorder, recurrent severe without psychotic features


ICD Codes:  F33.2 - Major depressive disorder, recurrent severe without 

psychotic features


Status:  Chronic


(3) Cocaine abuse


ICD Codes:  F14.10 - Cocaine abuse, uncomplicated


Status:  Acute


(4) Alcohol abuse


ICD Codes:  F10.10 - Alcohol abuse, uncomplicated


Status:  Acute


Assessment & Plan


Estimated LOS:  days


Justification for Cont. Inpt.


At this time patient would decompensate a place to the lower level of care


Discharge Planning


Possible referral to rehab facility versus returning to grandparents home


Request HC Surrog/Guard Advoc?:  No











Orville Webster MD May 9, 2018 10:24

## 2018-05-10 VITALS
SYSTOLIC BLOOD PRESSURE: 110 MMHG | HEART RATE: 72 BPM | OXYGEN SATURATION: 98 % | DIASTOLIC BLOOD PRESSURE: 63 MMHG | TEMPERATURE: 97.5 F | RESPIRATION RATE: 16 BRPM

## 2018-05-10 VITALS
HEART RATE: 90 BPM | SYSTOLIC BLOOD PRESSURE: 111 MMHG | DIASTOLIC BLOOD PRESSURE: 67 MMHG | TEMPERATURE: 97.1 F | OXYGEN SATURATION: 100 % | RESPIRATION RATE: 17 BRPM

## 2018-05-10 RX ADMIN — MIRTAZAPINE SCH MG: 15 TABLET, FILM COATED ORAL at 21:24

## 2018-05-10 RX ADMIN — NICOTINE SCH PATCH: 21 PATCH, EXTENDED RELEASE TOPICAL at 08:37

## 2018-05-10 NOTE — HHI.PYPN
Subjective


Chief Complaint:  Impression with suicidal ideation intent and plan


Remarks


Patient is seen in his room with nurse Barbara, and counselor Krystal, chart 

reviewed, patient compliant medications, patient discussed with nurse.  Patient 

continues to do well he denies suicidality or homicidality voices or visions.  

As his sleep continues to improve, he is so willing to go to the lumbar style 

for 30 day rehab program.  However that is yet to be arranged.  Patient is 

wanting to be discharged to be with his family.  At least for a period of time 

prior to the lumbar spine.  Patient states she is also willing to take the 

initiative work of [facility] if he is home for a period of time prior to 

being admitted there.  Thus I will consider discharging patient tomorrow in 

[place] that he has a bed available we will go directly there if not patient 

states he will be going to stay with his mother.  The does feel any there is 

some data there to help monitor supervising keep him company.





Review of Systems


Except as stated in HPI:  all other systems reviewed are Neg





Mental Status Examination


Appearance:  Appropriate


Consciousness:  Alert


Orientation:  x4


Motor Activity:  Normal gait


Speech:  Unremarkable


Language:  Adequate


Fund of Knowledge:  Adequate


Attention and Concentration:  Adequate


Memory:  Unremarkable


Mood:  Appropriate


Affect:  Appropriate


Thought Process & Associations:  Intact


Thought Content:  Appropriate


Hallucination Type:  None


Delusion Type:  Paranoid (Markedly diminished)


Suicidal Ideation:  No


Suicidal Plan:  No


Suicidal Intention:  No


Homicidal Ideation:  No


Homicidal Plan:  No


Homicidal Intention:  No


Insight:  Adequate


Judgment:  Adequate





Results


Vitals/IOs





Vital Signs








  Date Time  Temp Pulse Resp B/P (MAP) Pulse Ox O2 Delivery O2 Flow Rate FiO2


 


5/10/18 05:36 97.5 72 16 110/63 (79) 98   











Assessment & Plan


Problem List:  


(1) History of polysubstance abuse


(2) Major depressive disorder, recurrent severe without psychotic features


ICD Codes:  F33.2 - Major depressive disorder, recurrent severe without 

psychotic features


Status:  Chronic


(3) Cocaine abuse


ICD Codes:  F14.10 - Cocaine abuse, uncomplicated


Status:  Acute


(4) Alcohol abuse


ICD Codes:  F10.10 - Alcohol abuse, uncomplicated


Status:  Acute


Assessment & Plan


Estimated LOS:  days patient continues to do good denies suicidality and 

homicidality voices or visions continues to be willing to go to [facility].  

However if there is not available within the next 24-36 hours we will consider 

allowing patient to be discharged home tomorrow to follow up [facility] from in 

the community


Justification for Cont. Inpt.


This time patient would decompensate a place to a lower level of care


Discharge Planning


Hopefully to follow up with [facility]


Request HC Surrog/Guard Advoc?:  No











Orville Webster MD May 10, 2018 14:46

## 2018-05-11 VITALS
OXYGEN SATURATION: 98 % | SYSTOLIC BLOOD PRESSURE: 109 MMHG | HEART RATE: 69 BPM | RESPIRATION RATE: 16 BRPM | TEMPERATURE: 97.5 F | DIASTOLIC BLOOD PRESSURE: 53 MMHG

## 2018-05-11 RX ADMIN — NICOTINE SCH PATCH: 21 PATCH, EXTENDED RELEASE TOPICAL at 08:53

## 2018-05-11 NOTE — HHI.DS
Psychiatry Discharge Summary


Inpatient Psychiatric care?:  Yes


Advance Directive:  No


Reason Not Provided:  refused


Mental Health AdvanceDirective:  No


Health Care Proxy:  No


Admission


Admission Date


May 4, 2018 at 16:51


Admission Diagnosis:  


(1) Alcohol abuse


ICD Code:  F10.10 - Alcohol abuse, uncomplicated


(2) Cocaine abuse


ICD Code:  F14.10 - Cocaine abuse, uncomplicated


(3) Major depressive disorder, recurrent severe without psychotic features


ICD Code:  F33.2 - Major depressive disorder, recurrent severe without 

psychotic features


Brief History


Patient is a 23-year-old white male comes to the emergency department with a 

history of suicidal ideation and plan polysubstance abuse, Way acted in the 

emergency department by Dr. Siegel dated May 4, 2018 at 10:04 AM this 

document reviewed and agreed with.  Patient seen and screened in the ED urine 

toxicology positive for cocaine, alcohol Tylenol and aspirin levels negligible.

  Review of EMR shows patient was hospitalized twice as a teenager and [facility] once 

in  once in  for depression and suicidal ideation and also substance 

abuse at that time.  At the present time patient sitting quietly in his room, 

nurse Barbara present throughout session patient is a thin slight slender white 

male who appears his stated age with fairly thick short cut dark hair.  Patient 

states she has had increased depression over the past month or so.  He had been 

living with his grandparents.  His grandfather  in March fairly suddenly, 

his grandmother is becoming quite debilitated to the point where she is now in 

a nursing home and he is living by himself and his grandparents home with his 

PET Rottweiler.  Patient states increased depression with occasional panic type 

attacks with initial and middle and late insomnia, a.m. energy, with decreased 

energy, though vague crying spells, his appetite is poor though he denies any 

significant weight loss.  He is somewhat anhedonic.  There is increased social 

isolation, decreased concentration and attention, increased irritability and 

short temperedness.  While he denies voices or visions he states is marked 

increased paranoia especially around strangers.  He acknowledges increased use 

of alcohol and cocaine with past few weeks cocaine use essentially daily.  He 

acknowledges polysubstance abuse in the past partying with friends.  He denies 

any prior detox or rehab or legal issues related to drugs.  He denies any past 

physical or sexual abuse.  Of interest he states his mother and father were 

never  he is only now having a relationship with his father.  With his 

father he has been  twice subsequent to him and his children by those 2 

marriages, and his mother's side his mother was  prior to she having him 

the child by that marriage and she has been  after him the child by that 

marriage.  Patient has graduated high school and is not working at [employer].

  He has dated girls in the past but not dating anyone now.  When asked to take 

the suicide prole if offered he stated he would take the suicide pill that his 

suicide plan was to put a bag over his head and inhale helium.  Patient states 

he has been also diagnosed with ulcerative colitis


At this time patient does not meet criteria for acute inpatient psychiatric 

hospitalization of the Way act L the first appointment request second 

opinion.  We will start patient on Remeron 15 mg at at bedtime and Abilify 5 mg 

in the morning.  Need to discuss with this young man perhaps placement,  some 

type of rehab or sober living facility for him.


Tobacco Use In Past 30 Days:  5 or More Cigarettes/Day


Alcohol Use:  4 or More Times Per Week


Hospital Course


Patient's hospital course was uneventful, his initial isolation paranoia 

vigilance slowly resolved with his detoxification and cooperation with 

medication.  He became calm cooperative pleasant showing fair insight into his 

need for sobriety and treatment of his mental health issues along with 

developing independence from his family of origin.  Patient is willing, and his 

mother agrees, to go to the inpatient program through [facility].  At this time 

patient is recent maximum benefit of this hospitalization.  Patient to be 

discharged today.  He will go with his mother.   his stuff.  Then either 

this afternoon or tomorrow go to [facility] for admission to their inpatient 

rehab program patient given 1 month supply of his medication.  Also 

recommendation, of course, for absolute sobriety





Results


Blood Pressure


109 / 53





Vital Signs








  Date Time  Temp Pulse Resp B/P (MAP) Pulse Ox O2 Delivery O2 Flow Rate FiO2


 


18 05:36 97.5 69 16 109/53 (71) 98   











 Laboratory Results








Test


  18


09:15


 


Cholesterol Level


  100 MG/DL


(120-200)


 


HDL Cholesterol


  50.3 MG/DL


(40.0-60.0)


 


Hemoglobin A1c


  4.9 %


(4.3-6.0)


 


LDL Cholesterol


  30 MG/DL


(0-99)


 


Triglycerides Level


  98 MG/DL


()








Summary of Procedures


None done


Pending results at discharge:  No





Medications


# of Antipsychotic meds at D/C:  1


Approp Antipsych med options


1 - Minimum of three failed multiple trials of monotherapy.


2 - Documented plan to taper to monotherapy due to previous use of multiple 

meds OR cross-taper in progress at D/C.


3 - Documentation of augmentation of Clozapine.


4 - Justification other than those listed in allowable values 1-3, document here

:





Discharge


Discharge Date:  May 11, 2018


Discharge Diagnosis:  


(1) Major depressive disorder, recurrent severe without psychotic features


Diagnosis:  Principal


ICD Code:  F33.2 - Major depressive disorder, recurrent severe without 

psychotic features


Status:  Chronic


(2) Cocaine abuse


Diagnosis:  Secondary


ICD Code:  F14.10 - Cocaine abuse, uncomplicated


Status:  Acute


(3) Alcohol abuse


Diagnosis:  Secondary (Go to [facility] for admission for rehab treatment in this 

afternoon or tomorrow morning)


ICD Code:  F10.10 - Alcohol abuse, uncomplicated


Status:  Acute


Pt Condition on Discharge:  Stable


Discharge Disposition:  Discharge Home





Discharge Instructions


Diet Instructions:  As Tolerated, No Restrictions


Activities you can perform:  Regular-No Restrictions


Scheduled Appointment:  [facility]





Discharge Time


> 30 minutes





Mental Status Examination


Appearance:  Appropriate


Consciousness:  Alert


Orientation:  x4


Motor Activity:  Normal gait


Speech:  Unremarkable


Language:  Adequate


Fund of Knowledge:  Adequate


Attention and Concentration:  Adequate


Memory:  Unremarkable


Mood:  Appropriate


Affect:  Appropriate


Thought Process & Associations:  Intact


Thought Content:  Appropriate


Hallucination Type:  None


Delusion Type:  Paranoid (Markedly diminished)


Suicidal Ideation:  No


Suicidal Plan:  No


Suicidal Intention:  No


Homicidal Ideation:  No


Homicidal Plan:  No


Homicidal Intention:  No


Insight:  Adequate


Judgment:  Adequate





Discharge/Advance Care Plan


Health Problems:  


(1) History of polysubstance abuse


(2) Major depressive disorder, recurrent severe without psychotic features


(3) Cocaine abuse


(4) Alcohol abuse


Goals to promote your health


* To prevent worsening of your condition and complications


* To maintain your health at the optimal level


Directions to meet your goals


*** Take your medications as prescribed


***  Follow your dietary instruction


***  Follow activity as directed





***  Keep your appointments as scheduled


***  Take your immunizations and boosters as scheduled


***  If your symptoms worsen call your PCP, if no PCP go to Urgent Care Center 

or Emergency Room ***


***  For  questions related to your inpatient stay or results of tests 

pending at discharge, please contact Dr. Orville Webster at [phone]


***  Smoking is Dangerous to Your Health. Avoid second hand smoking ***











Orville Webster MD May 11, 2018 11:05

## 2018-05-12 NOTE — PD.TTN
Patient Problems


1. Discharge planning


2. Medication compliance


3. Knowledge deficit


4. Lack of coping skills





Progress Toward Goals


Provider Present:  Dr. CARLIN Webster


Provider Input:  


5/9/18 patient is open to treatment for his substance abuse 


he has medications started which will be titrated throughout week


Psychiatric Counselors Present:  Xuan Sue LCSW


Psych Therapist Input:  


5/9/18 patient has improved and showing some insight and motivattion for


ongoing treatment


Group Spec/RT/OT/BARAJAS Present:  JENN Mathur (5/7/18- Pt. was cooperative 

and pleasant during groups.)


Group Spec/RT/OT/BARAJAS Input:  


5/9/18 he is quiet and attends groups











Xuan Sue LCSW May 12, 2018 15:53